# Patient Record
Sex: FEMALE | Race: WHITE | Employment: FULL TIME | URBAN - METROPOLITAN AREA
[De-identification: names, ages, dates, MRNs, and addresses within clinical notes are randomized per-mention and may not be internally consistent; named-entity substitution may affect disease eponyms.]

---

## 2023-02-11 ENCOUNTER — OFFICE VISIT (OUTPATIENT)
Dept: URGENT CARE | Age: 23
End: 2023-02-11

## 2023-02-11 VITALS
WEIGHT: 250 LBS | HEIGHT: 67 IN | HEART RATE: 90 BPM | OXYGEN SATURATION: 99 % | TEMPERATURE: 98.5 F | SYSTOLIC BLOOD PRESSURE: 123 MMHG | RESPIRATION RATE: 18 BRPM | BODY MASS INDEX: 39.24 KG/M2 | DIASTOLIC BLOOD PRESSURE: 82 MMHG

## 2023-02-11 DIAGNOSIS — K80.50 BILIARY COLIC: Primary | ICD-10-CM

## 2023-02-11 RX ORDER — ONDANSETRON 4 MG/1
4 TABLET, ORALLY DISINTEGRATING ORAL 3 TIMES DAILY PRN
Qty: 21 TABLET | Refills: 0 | Status: SHIPPED | OUTPATIENT
Start: 2023-02-11

## 2023-02-11 ASSESSMENT — ENCOUNTER SYMPTOMS
DIARRHEA: 0
RESPIRATORY NEGATIVE: 1
RECTAL PAIN: 0
VOMITING: 1
BLOOD IN STOOL: 0
ABDOMINAL PAIN: 1
CONSTIPATION: 0
NAUSEA: 1
EYES NEGATIVE: 1

## 2023-02-11 NOTE — PATIENT INSTRUCTIONS
Divide diet, brat diet is fine. Take Zofran for nausea. Off work today. May return to work next scheduled shift on Thursday. Tylenol or Advil. Low-fat diet.

## 2023-02-11 NOTE — PROGRESS NOTES
Phong Montano (:  2000) is a 20 y.o. female,New patient, here for evaluation of the following chief complaint(s):  Fever, Headache, Abdominal Pain, and Nausea (Thursday started thought it might be her gallbladder )         ASSESSMENT/PLAN:  1. Biliary colic    Return in about 1 week (around 2023), or if symptoms worsen or fail to improve. 58-year-old obese female with family history of gallbladder disease presents with 2 to 3 days of nausea vomiting. Right upper quadrant pain. Documented ultrasound which shows a contracted gallbladder. The pain gets worse after eating. Patient is nauseated. Patient will be placed on Zofran. Off work note was given. Patient will follow-up. Patient discharged in good condition. Subjective   SUBJECTIVE/OBJECTIVE:  20year-old with couple episodes of right upper quadrant pain. Patient has been to a family physician in the ER in the past.  Had a gallbladder ultrasound which shows a contracted bladder unable to visualize a stone. Patient states for past 3 to 4 days patient's been having nausea and right upper quadrant pain with radiation to the scapula. Patient went to work yesterday and was sent home she scheduled to do work today. 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 Patient was sent home and did not report to work this morning. Patient requesting some nausea medication. There has been no fevers no chills. Patient is  in that right upper quadrant. No diarrhea. Review of Systems   Constitutional:  Positive for appetite change. HENT: Negative. Eyes: Negative. Respiratory: Negative. Cardiovascular: Negative. Gastrointestinal:  Positive for abdominal pain, nausea and vomiting. Negative for blood in stool, constipation, diarrhea and rectal pain. Endocrine: Negative. Genitourinary: Negative. Neurological: Negative. All other systems reviewed and are negative.        Objective   Physical Exam  Vitals and nursing note reviewed. Constitutional:       Appearance: Normal appearance. HENT:      Head: Normocephalic and atraumatic. Nose: Nose normal.      Mouth/Throat:      Mouth: Mucous membranes are moist.   Eyes:      Pupils: Pupils are equal, round, and reactive to light. Cardiovascular:      Rate and Rhythm: Normal rate and regular rhythm. Pulses: Normal pulses. Heart sounds: Normal heart sounds. Pulmonary:      Effort: Pulmonary effort is normal.      Breath sounds: Normal breath sounds. Abdominal:      General: Abdomen is flat. Palpations: Abdomen is soft. Tenderness: There is abdominal tenderness. There is no guarding or rebound. Musculoskeletal:         General: Normal range of motion. Cervical back: Normal range of motion and neck supple. Skin:     General: Skin is warm and dry. Neurological:      General: No focal deficit present. Mental Status: She is alert and oriented to person, place, and time. Mental status is at baseline. An electronic signature was used to authenticate this note.     --HUANG HENRY MD

## 2023-02-11 NOTE — LETTER
Evan Castro was seen and treated at [id number] [facility] [place] [facility] [place] on 02/11/2023. She may return to work on 02/16/2023       If you have any questions or concerns, please don't hesitate to call.  [phone]